# Patient Record
Sex: FEMALE | Race: WHITE | NOT HISPANIC OR LATINO | URBAN - METROPOLITAN AREA
[De-identification: names, ages, dates, MRNs, and addresses within clinical notes are randomized per-mention and may not be internally consistent; named-entity substitution may affect disease eponyms.]

---

## 2023-06-21 ENCOUNTER — MEDICAL CORRESPONDENCE (OUTPATIENT)
Dept: HEALTH INFORMATION MANAGEMENT | Facility: CLINIC | Age: 33
End: 2023-06-21
Payer: OTHER GOVERNMENT

## 2023-06-22 ENCOUNTER — TRANSCRIBE ORDERS (OUTPATIENT)
Dept: OTHER | Age: 33
End: 2023-06-22

## 2023-06-22 DIAGNOSIS — E03.8 SUBCLINICAL HYPOTHYROIDISM: Primary | ICD-10-CM

## 2024-05-02 NOTE — CONFIDENTIAL NOTE
RECORDS RECEIVED FROM: internal/ce    DATE RECEIVED: 5/8/24    NOTES (FOR ALL VISITS) STATUS DETAILS   OFFICE NOTES from referring provider ce Dr. Veronica Reeder @ [facility]    MEDICATION LIST     IMAGING      XR (Chest) ce [facility]- 3.13.24    LABS     DIABETES: HBGA1C, CREATININE, FASTING LIPIDS, MICROALBUMIN URINE, POTASSIUM, TSH, T4    THYROID: TSH, T4, CBC, THYRODLONULIN, TOTAL T3, FREE T4, CALCITONIN, CEA ce Bmp- 3.12.24  TSH- 2.27.24  T4- 2.27.24  CMP- 8/17/23  Lipid- 2.21.23

## 2024-05-08 ENCOUNTER — VIRTUAL VISIT (OUTPATIENT)
Dept: ENDOCRINOLOGY | Facility: CLINIC | Age: 34
End: 2024-05-08
Payer: OTHER GOVERNMENT

## 2024-05-08 ENCOUNTER — LAB (OUTPATIENT)
Dept: LAB | Facility: CLINIC | Age: 34
End: 2024-05-08
Payer: OTHER GOVERNMENT

## 2024-05-08 ENCOUNTER — PRE VISIT (OUTPATIENT)
Dept: ENDOCRINOLOGY | Facility: CLINIC | Age: 34
End: 2024-05-08

## 2024-05-08 DIAGNOSIS — E03.8 SUBCLINICAL HYPOTHYROIDISM: ICD-10-CM

## 2024-05-08 DIAGNOSIS — E03.9 HYPOTHYROIDISM, UNSPECIFIED TYPE: Primary | ICD-10-CM

## 2024-05-08 DIAGNOSIS — E03.9 HYPOTHYROIDISM, UNSPECIFIED TYPE: ICD-10-CM

## 2024-05-08 LAB
T4 FREE SERPL-MCNC: 0.85 NG/DL (ref 0.9–1.7)
TSH SERPL DL<=0.005 MIU/L-ACNC: 4.5 UIU/ML (ref 0.3–4.2)

## 2024-05-08 PROCEDURE — 99204 OFFICE O/P NEW MOD 45 MIN: CPT | Mod: 95 | Performed by: INTERNAL MEDICINE

## 2024-05-08 PROCEDURE — 84439 ASSAY OF FREE THYROXINE: CPT

## 2024-05-08 PROCEDURE — 86376 MICROSOMAL ANTIBODY EACH: CPT

## 2024-05-08 PROCEDURE — 36415 COLL VENOUS BLD VENIPUNCTURE: CPT

## 2024-05-08 PROCEDURE — 84443 ASSAY THYROID STIM HORMONE: CPT

## 2024-05-08 RX ORDER — LEVOTHYROXINE SODIUM 50 MCG
50 TABLET ORAL DAILY
Qty: 90 TABLET | Refills: 0 | Status: SHIPPED | OUTPATIENT
Start: 2024-05-08 | End: 2024-08-12

## 2024-05-08 NOTE — PATIENT INSTRUCTIONS
Patient instructions  The TSH is high and free T4 is low and this result is consistent with overt hypothyroidism.  This means your thyroid gland is not making enough thyroid hormone. This hormone is vital to body growth and metabolism. If you don't make enough, many body processes slow down. This can cause symptoms throughout the body. Hypothyroidism can range from mild to severe. There are a number of causes of hypothyroidism. A common cause is Hashimoto's disease. This disease causes the body's own immune system to attack the thyroid gland.  The thyroid peroxidase antibody is still pending and will reach out to you once we have results.      I recommend treatment of this condition with replacement of the thyroid hormone in the form of synthetic thyroid hormone-Synthroid.  I understand that previously you do not tolerate levothyroxine therefore brand form which is called Synthroid is sent to your pharmacy at 50 mcg daily.  Take your medication as prescribed.     Synthroid should be taken on empty stomach and wait at least 30 minutes before eating. Don't take supplements or multivitamins containing iron and calcium within 4 hours of taking levothyroxine tablet.     Treatment of hypothyroidism and normalization of thyroid hormones is recommended prior to pregnancy.  Please check a follow-up thyroid blood work in 6 weeks.  In the future when you are actively trying to get pregnant or get pregnant please let us know right away so that thyroid medication can be adjusted.      Check the following labs.   Orders Placed This Encounter   Procedures    TSH with free T4 reflex    Thyroid peroxidase antibody      Lab scheduling to schedule at any [place] lab locations: [phone] select option 1.

## 2024-05-08 NOTE — LETTER
5/8/2024       RE: Fely Burnham  [address]  [place] [address]     Dear Colleague,    Thank you for referring your patient, Fely Burnham, to the [facility] ENDOCRINOLOGY [facility] [place] at [facility]. Please see a copy of my visit note below.    Endocrinology Clinic Visit    Chief Complaint: Thyroid     Information obtained from:Patient      Assessment/Treatment Plan:      Hypothyroidism unspecified    TSH over the last 1 year has been ranging between 5.6-13.3 with normal free T4 levels confirming subclinical hypothyroidism.  Patient was initiated on levothyroxine 50 mcg daily however did not tolerate the medication due to palpitations.  Stopped medication in November 2023 and most recent TSH was 5.68 in February 2024.  Does not endorse significant hypothyroid symptoms.  Symptoms which led to thyroid blood work assessment well palpitation and anxiety.    Checked thyroid labs today and she has overt hypothyroidism with free T4 of 0.85 with TSH of 4.5.    Plan-start Synthroid 50 mcg daily [patient did not tolerate levothyroxine previously].  Check follow-up thyroid blood work in 6 weeks. Levothyroxine should be taken on empty stomach and wait at least 30 minutes before eating. Don't take supplements or multivitamins containing iron and calcium within 4 hours of taking levothyroxine tablet.     Implication of untreated hypothyroidism on pregnancy addressed.  Not actively trying to get pregnant at this time.    Patient instructions  The TSH is high and free T4 is low and this result is consistent with overt hypothyroidism.  This means your thyroid gland is not making enough thyroid hormone. This hormone is vital to body growth and metabolism. If you don't make enough, many body processes slow down. This can cause symptoms throughout the body. Hypothyroidism can range from mild to severe. There are a number of causes of hypothyroidism. A common  cause is Hashimoto's disease. This disease causes the body's own immune system to attack the thyroid gland.  The thyroid peroxidase antibody is still pending and will reach out to you once we have results.      I recommend treatment of this condition with replacement of the thyroid hormone in the form of synthetic thyroid hormone-Synthroid.  I understand that previously you do not tolerate levothyroxine therefore brand form which is called Synthroid is sent to your pharmacy at 50 mcg daily.  Take your medication as prescribed. Levothyroxine should be taken on empty stomach and wait at least 30 minutes before eating. Don't take supplements or multivitamins containing iron and calcium within 4 hours of taking levothyroxine tablet.     Treatment of hypothyroidism and normalization of thyroid hormones is recommended prior to pregnancy.  Please check a follow-up thyroid blood work in 6 weeks.  In the future when you are actively trying to get pregnant or get pregnant please let us know right away so that thyroid medication can be adjusted.    Orders Placed This Encounter   Procedures    TSH with free T4 reflex    Thyroid peroxidase antibody    TSH with free T4 reflex      Henrry Mas MD  Staff Endocrinologist    Division of Endocrinology and Diabetes      Subjective:         HPI: Fely Burnham is a 34 year old female with history of hypothyroidism who [facility]    Had palpitation and anxiety in January 2023 which led to thyroid blood work assessment and at that time TSH was elevated above 13 with normal free T4.  Later on was initiated on levothyroxine 50 mcg daily however did not tolerate the medication due to palpitations.  Has stopped levothyroxine replacement therapy and have not taken it since 11//2023.  Reports occasional palpitation however better than when she has been taking levothyroxine tablet.  Review of system negative for  cardiac, GI, urinary symptoms.  Menses regular.  Not currently pregnant or actively trying.  She has 1 child and they are planning in the future for a second.  Family history of thyroid disease.     Latest Ref Rng 5/8/2024  3:16 PM   ENDO THYROID LABS-[facility]     TSH 0.30 - 4.20 uIU/mL 4.50 (H)    FREE T4 0.90 - 1.70 ng/dL 0.85 (L)       No Known Allergies    No current outpatient medications on file.       Review of Systems     11 point review system (Constitutional, HENT, Eyes, Respiratory, Cardiovascular, Gastrointestinal, Genitourinary, Musculoskeletal,Neurological, Psychiatric/Behavioural, Endocrine) is negative or is as per HPI above    Past Medical History:   Diagnosis Date    Thyroid disease Jan 23    Subclinical       Past Surgical History:   Procedure Laterality Date    COLONOSCOPY  10/19    COSMETIC SURGERY  10/19 + jan 22    Breast implant then breast explant+ lift and smallest implant put in       Family History   Problem Relation Age of Onset    Anxiety Disorder Father     Osteoporosis Father     Other Cancer Maternal Grandmother         Nonhodgkin lymphoma    Thyroid Disease Paternal Grandmother         Hypo    Thyroid Disease Paternal Half-Sister         Actual sister not half - hypo thyroid       Social History     Socioeconomic History    Marital status:      Spouse name: None    Number of children: None    Years of education: None    Highest education level: None   Tobacco Use    Smoking status: Never     Passive exposure: Never    Smokeless tobacco: Never   Vaping Use    Vaping status: Never Used   Substance and Sexual Activity    Alcohol use: Yes     Comment: Few glasses of wine on the weekends or out to dinner    Drug use: Never    Sexual activity: Yes     Partners: Male     Birth control/protection: Pull-out method     Social Determinants of Health      Received [facility]    Social Connections   Interpersonal Safety: Not At Risk (3/12/2024)     Received from [facility] IP Custom IPV     Do you feel UNSAFE in any of your personal relationships with your family members or any other acquaintances?: No       Objective:   Appears well.    In House Labs:           This note has been dictated using voice recognition software.  As a result, there may be errors in the documentation that have gone undetected.  Please consider this when interpreting information in this documentation.      Video-Visit Details    Type of service:  Video Visit  Joined the call at 5/8/2024, 9:20:00 am.  Left the call at 5/8/2024, 9:35:27 am.  You were on the call for 15 minutes 27 seconds .  Distant Location (provider location):  Off-site.   Platform used for Video Visit: [employer]  40 minutes spent by me on the date of the encounter doing chart review on care everywhere, history, documentation and further activities per the note.

## 2024-05-08 NOTE — PROGRESS NOTES
Endocrinology Clinic Visit    Chief Complaint: Thyroid     Information obtained from:Patient      Assessment/Treatment Plan:      Hypothyroidism unspecified    TSH over the last 1 year has been ranging between 5.6-13.3 with normal free T4 levels confirming subclinical hypothyroidism.  Patient was initiated on levothyroxine 50 mcg daily however did not tolerate the medication due to palpitations.  Stopped medication in November 2023 and most recent TSH was 5.68 in February 2024.  Does not endorse significant hypothyroid symptoms.  Symptoms which led to thyroid blood work assessment well palpitation and anxiety.    Checked thyroid labs today and she has overt hypothyroidism with free T4 of 0.85 with TSH of 4.5.    Plan-start Synthroid 50 mcg daily [patient did not tolerate levothyroxine previously].  Check follow-up thyroid blood work in 6 weeks. Levothyroxine should be taken on empty stomach and wait at least 30 minutes before eating. Don't take supplements or multivitamins containing iron and calcium within 4 hours of taking levothyroxine tablet.     Implication of untreated hypothyroidism on pregnancy addressed.  Not actively trying to get pregnant at this time.    Patient instructions  The TSH is high and free T4 is low and this result is consistent with overt hypothyroidism.  This means your thyroid gland is not making enough thyroid hormone. This hormone is vital to body growth and metabolism. If you don't make enough, many body processes slow down. This can cause symptoms throughout the body. Hypothyroidism can range from mild to severe. There are a number of causes of hypothyroidism. A common cause is Hashimoto's disease. This disease causes the body's own immune system to attack the thyroid gland.  The thyroid peroxidase antibody is still pending and will reach out to you once we have results.      I recommend treatment of this condition with replacement of the thyroid hormone in the form of synthetic thyroid  hormone-Synthroid.  I understand that previously you do not tolerate levothyroxine therefore brand form which is called Synthroid is sent to your pharmacy at 50 mcg daily.  Take your medication as prescribed. Levothyroxine should be taken on empty stomach and wait at least 30 minutes before eating. Don't take supplements or multivitamins containing iron and calcium within 4 hours of taking levothyroxine tablet.     Treatment of hypothyroidism and normalization of thyroid hormones is recommended prior to pregnancy.  Please check a follow-up thyroid blood work in 6 weeks.  In the future when you are actively trying to get pregnant or get pregnant please let us know right away so that thyroid medication can be adjusted.    Orders Placed This Encounter   Procedures    TSH with free T4 reflex    Thyroid peroxidase antibody    TSH with free T4 reflex      Henrry Mas MD  Staff Endocrinologist    Division of Endocrinology and Diabetes      Subjective:         HPI: Fely Burnham is a 34 year old female with history of hypothyroidism who [facility].    Had palpitation and anxiety in January 2023 which led to thyroid blood work assessment and at that time TSH was elevated above 13 with normal free T4.  Later on was initiated on levothyroxine 50 mcg daily however did not tolerate the medication due to palpitations.  Has stopped levothyroxine replacement therapy and have not taken it since 11//2023.  Reports occasional palpitation however better than when she has been taking levothyroxine tablet.  Review of system negative for cardiac, GI, urinary symptoms.  Menses regular.  Not currently pregnant or actively trying.  She has 1 child and they are planning in the future for a second.  Family history of thyroid disease.     Latest Ref Rng 5/8/2024  3:16 PM   ENDO THYROID LABS-[facility]     TSH 0.30 - 4.20 uIU/mL 4.50 (H)    FREE T4 0.90 - 1.70 ng/dL 0.85  (L)       No Known Allergies    No current outpatient medications on file.       Review of Systems     11 point review system (Constitutional, HENT, Eyes, Respiratory, Cardiovascular, Gastrointestinal, Genitourinary, Musculoskeletal,Neurological, Psychiatric/Behavioural, Endocrine) is negative or is as per HPI above    Past Medical History:   Diagnosis Date    Thyroid disease Jan 23    Subclinical       Past Surgical History:   Procedure Laterality Date    COLONOSCOPY  10/19    COSMETIC SURGERY  10/19 + jan 22    Breast implant then breast explant+ lift and smallest implant put in       Family History   Problem Relation Age of Onset    Anxiety Disorder Father     Osteoporosis Father     Other Cancer Maternal Grandmother         Nonhodgkin lymphoma    Thyroid Disease Paternal Grandmother         Hypo    Thyroid Disease Paternal Half-Sister         Actual sister not half - hypo thyroid       Social History     Socioeconomic History    Marital status:      Spouse name: None    Number of children: None    Years of education: None    Highest education level: None   Tobacco Use    Smoking status: Never     Passive exposure: Never    Smokeless tobacco: Never   Vaping Use    Vaping status: Never Used   Substance and Sexual Activity    Alcohol use: Yes     Comment: Few glasses of wine on the weekends or out to dinner    Drug use: Never    Sexual activity: Yes     Partners: Male     Birth control/protection: Pull-out method     Social Determinants of Health      Received from [facility]    Social Connections   Interpersonal Safety: Not At Risk (3/12/2024)    Received from [facility]     IP Custom IPV     Do you feel UNSAFE in any of your personal relationships with your family members or any other acquaintances?: No       Objective:   Appears well.    In House Labs:           This note has been dictated using voice recognition software.  As a  result, there may be errors in the documentation that have gone undetected.  Please consider this when interpreting information in this documentation.      Video-Visit Details    Type of service:  Video Visit  Joined the call at 5/8/2024, 9:20:00 am.  Left the call at 5/8/2024, 9:35:27 am.  You were on the call for 15 minutes 27 seconds .  Distant Location (provider location):  Off-site.   Platform used for Video Visit: [employer]  40 minutes spent by me on the date of the encounter doing chart review on care everywhere, history, documentation and further activities per the note.

## 2024-05-09 ENCOUNTER — TELEPHONE (OUTPATIENT)
Dept: ENDOCRINOLOGY | Facility: CLINIC | Age: 34
End: 2024-05-09
Payer: OTHER GOVERNMENT

## 2024-05-09 LAB — THYROPEROXIDASE AB SERPL-ACNC: 712 IU/ML

## 2024-05-09 NOTE — RESULT ENCOUNTER NOTE
No my chart access.   Team, please call and inform the following.   The TSH is high and free T4 is low and this result is consistent with overt hypothyroidism.  You have hypothyroidism. This means your thyroid gland is not making enough thyroid hormone. This hormone is vital to body growth and metabolism. If you don't make enough, many body processes slow down. This can cause symptoms throughout the body. Hypothyroidism can range from mild to severe. There are a number of causes of hypothyroidism. A common cause is Hashimoto's disease. This disease causes the body's own immune system to attack the thyroid gland.  The thyroid peroxidase antibody is still pending and will reach out to you once we have results.      I recommend treatment of this condition with replacement of the thyroid hormone in the form of synthetic thyroid hormone-Synthroid.  I understand that previously you do not tolerate levothyroxine therefore brand form which is called Synthroid is sent to your pharmacy at 50 mcg daily.  Take your medication as prescribed And Levothyroxine should be taken on empty stomach and wait at least 30 minutes before eating. Don't take supplements or multivitamins containing iron and calcium within 4 hours of taking levothyroxine tablet.     Treatment of hypothyroidism and normalization of thyroid hormones is recommended prior to pregnancy.  Please check a follow-up thyroid blood work in 6 weeks.  In the future when you are actively trying to get pregnant or get pregnant please let us know right away so that thyroid medication can be adjusted.    Please let us know if any questions.    Henrry Mas MD

## 2024-05-09 NOTE — TELEPHONE ENCOUNTER
Spoke w/ Pt. Confirms understanding of review and recommendation from Dr Mas.   No questions at this time.   Cynthia Bazzi, RN on 5/9/2024 at 7:32 AM            Message  Received: Yesterday  Henrry Mas MD   Med Specialties Endo Triage-  No my chart access.  Team, please call and inform the following.  The TSH is high and free T4 is low and this result is consistent with overt hypothyroidism.  You have hypothyroidism. This means your thyroid gland is not making enough thyroid hormone. This hormone is vital to body growth and metabolism. If you don't make enough, many body processes slow down. This can cause symptoms throughout the body. Hypothyroidism can range from mild to severe. There are a number of causes of hypothyroidism. A common cause is Hashimoto's disease. This disease causes the body's own immune system to attack the thyroid gland.  The thyroid peroxidase antibody is still pending and will reach out to you once we have results.      I recommend treatment of this condition with replacement of the thyroid hormone in the form of synthetic thyroid hormone-Synthroid.  I understand that previously you do not tolerate levothyroxine therefore brand form which is called Synthroid is sent to your pharmacy at 50 mcg daily.  Take your medication as prescribed And Levothyroxine should be taken on empty stomach and wait at least 30 minutes before eating. Don't take supplements or multivitamins containing iron and calcium within 4 hours of taking levothyroxine tablet.    Treatment of hypothyroidism and normalization of thyroid hormones is recommended prior to pregnancy.  Please check a follow-up thyroid blood work in 6 weeks.  In the future when you are actively trying to get pregnant or get pregnant please let us know right away so that thyroid medication can be adjusted.    Please let us know if any questions.    Henrry Mas MD

## 2024-05-10 NOTE — RESULT ENCOUNTER NOTE
No my chart access.  Team, please send a letter (additional information on lab results included]    Fely Burnham   The TSH is high and free T4 is low and this result is consistent with overt hypothyroidism.  You have hypothyroidism. This means your thyroid gland is not making enough thyroid hormone. This hormone is vital to body growth and metabolism. If you don't make enough, many body processes slow down. This can cause symptoms throughout the body. Hypothyroidism can range from mild to severe.     There are a number of causes of hypothyroidism. A common cause is Hashimoto's disease. This disease causes the body's own immune system to attack the thyroid gland.  The thyroid peroxidase antibody is positive in your case which confirms a diagnosis of hypothyroidism secondary to Hashimoto thyroiditis.    I recommend treatment of this condition with replacement of the thyroid hormone in the form of synthetic thyroid hormone-Synthroid.  I understand that previously you do not tolerate levothyroxine therefore brand form which is called Synthroid is sent to your pharmacy at 50 mcg daily.      Take your medication as prescribed And Levothyroxine should be taken on empty stomach and wait at least 30 minutes before eating. Don't take supplements or multivitamins containing iron and calcium within 4 hours of taking levothyroxine tablet.    Treatment of hypothyroidism and normalization of thyroid hormones is recommended prior to pregnancy.  Please check a follow-up thyroid blood work in 6 weeks.  In the future when you are actively trying to get pregnant or get pregnant please let us know right away so that thyroid medication can be adjusted.    Please let us know if any questions.    Henrry Mas MD

## 2024-05-13 ENCOUNTER — TELEPHONE (OUTPATIENT)
Dept: ENDOCRINOLOGY | Facility: CLINIC | Age: 34
End: 2024-05-13
Payer: OTHER GOVERNMENT

## 2024-05-13 NOTE — TELEPHONE ENCOUNTER
Lab review and recommendation mailed to pt via "[employer].   Cynthia Bazzi RN on 5/13/2024 at 9:02 AM          Message  Received: 3 days ago  Henrry Mas MD  [facility] Endo Triage-  No my chart access.  Team, please send a letter (additional information on lab results included]    Fely BLAZE Monae Burnham  The TSH is high and free T4 is low and this result is consistent with overt hypothyroidism.  You have hypothyroidism. This means your thyroid gland is not making enough thyroid hormone. This hormone is vital to body growth and metabolism. If you don't make enough, many body processes slow down. This can cause symptoms throughout the body. Hypothyroidism can range from mild to severe.    There are a number of causes of hypothyroidism. A common cause is Hashimoto's disease. This disease causes the body's own immune system to attack the thyroid gland.  The thyroid peroxidase antibody is positive in your case which confirms a diagnosis of hypothyroidism secondary to Hashimoto thyroiditis.     I recommend treatment of this condition with replacement of the thyroid hormone in the form of synthetic thyroid hormone-Synthroid.  I understand that previously you do not tolerate levothyroxine therefore brand form which is called Synthroid is sent to your pharmacy at 50 mcg daily.      Take your medication as prescribed And Levothyroxine should be taken on empty stomach and wait at least 30 minutes before eating. Don't take supplements or multivitamins containing iron and calcium within 4 hours of taking levothyroxine tablet.     Treatment of hypothyroidism and normalization of thyroid hormones is recommended prior to pregnancy.  Please check a follow-up thyroid blood work in 6 weeks.  In the future when you are actively trying to get pregnant or get pregnant please let us know right away so that thyroid medication can be adjusted.     Please let us know if any questions.     Henrry Mas MD

## 2024-05-21 ENCOUNTER — MYC MEDICAL ADVICE (OUTPATIENT)
Dept: ENDOCRINOLOGY | Facility: CLINIC | Age: 34
End: 2024-05-21
Payer: OTHER GOVERNMENT

## 2024-06-01 ENCOUNTER — MYC MEDICAL ADVICE (OUTPATIENT)
Dept: ENDOCRINOLOGY | Facility: CLINIC | Age: 34
End: 2024-06-01
Payer: OTHER GOVERNMENT

## 2024-06-01 DIAGNOSIS — Z3A.01 LESS THAN 8 WEEKS GESTATION OF PREGNANCY: ICD-10-CM

## 2024-06-01 DIAGNOSIS — E06.3 HYPOTHYROIDISM DUE TO HASHIMOTO'S THYROIDITIS: Primary | ICD-10-CM

## 2024-06-06 NOTE — TELEPHONE ENCOUNTER
Patient was seen for a new consultation on 5/8/24.   TSH over the last 1 year has been ranging between 5.6-13.3 with normal free T4 levels confirming subclinical hypothyroidism.  Patient was initiated on levothyroxine 50 mcg daily however did not tolerate the medication due to palpitations.  Stopped medication in November 2023 and when she came to establish care was not on anti-thyroid medication.     At the time of visit on 5/8/24 - overt hypothyroidism with free T4 of 0.85 with TSH of 4.5.  Started on Levothyroxine 50 mcg daily on 5/8/24.   Pt reported on 6/2/24 started medication about a week and half ago due to symptoms of palpitations.   Reported on 6/2/ 24 that pregnant currently and conception date May 13 per report.   Advised to continue the medication at a full dose.   Weight based levothyroxine 99 mcg daily.   [place] and [place] lab on Monday Gladis 10, 2024.   Increase dose of levothyroxine after Gladis 10 lab.   Adjust levothyroxine based on lab results.   Henrry Mas MD

## 2024-06-10 ENCOUNTER — LAB (OUTPATIENT)
Dept: LAB | Facility: CLINIC | Age: 34
End: 2024-06-10
Payer: OTHER GOVERNMENT

## 2024-06-10 DIAGNOSIS — Z3A.01 LESS THAN 8 WEEKS GESTATION OF PREGNANCY: ICD-10-CM

## 2024-06-10 DIAGNOSIS — E06.3 HYPOTHYROIDISM DUE TO HASHIMOTO'S THYROIDITIS: ICD-10-CM

## 2024-06-10 DIAGNOSIS — E03.9 HYPOTHYROIDISM, UNSPECIFIED TYPE: ICD-10-CM

## 2024-06-10 LAB
T4 FREE SERPL-MCNC: 1.15 NG/DL (ref 0.9–1.7)
T4 SERPL-MCNC: 5.9 UG/DL (ref 4.5–11.7)
TSH SERPL DL<=0.005 MIU/L-ACNC: 2.82 UIU/ML (ref 0.3–4.2)

## 2024-06-10 PROCEDURE — 84436 ASSAY OF TOTAL THYROXINE: CPT

## 2024-06-10 PROCEDURE — 84443 ASSAY THYROID STIM HORMONE: CPT

## 2024-06-10 PROCEDURE — 36415 COLL VENOUS BLD VENIPUNCTURE: CPT

## 2024-06-10 PROCEDURE — 84439 ASSAY OF FREE THYROXINE: CPT

## 2024-06-10 NOTE — RESULT ENCOUNTER NOTE
"Hello -    I received your message \" I just want to note that I didnt start the synthroid may 22. I have bloodwork tomorrow so we will see where it is but I just wanted you to know that in case it hasnt leveled off yet that is probably why. I was out of town from may 10 - 21 so didnt start taking it until the 22nd of may so been on it about 3 weeks\"    Based on this information and below -  -TSH (thyroid stimulating hormone) level is normal.   ADVISE: continuing same replacement dose and rechecking this in 6 weeks.  Levothyroxine should be taken on empty stomach and wait at least 30 minutes before eating. Don't take supplements or multivitamins containing iron and calcium within 4 hours of taking levothyroxine tablet.       Please let us know if you have any questions or concerns.     Regards,  Henrry Mas MD"

## 2024-07-18 ENCOUNTER — MYC MEDICAL ADVICE (OUTPATIENT)
Dept: ENDOCRINOLOGY | Facility: CLINIC | Age: 34
End: 2024-07-18

## 2024-07-18 ENCOUNTER — LAB (OUTPATIENT)
Dept: LAB | Facility: CLINIC | Age: 34
End: 2024-07-18
Payer: OTHER GOVERNMENT

## 2024-07-18 DIAGNOSIS — E06.3 HYPOTHYROIDISM DUE TO HASHIMOTO'S THYROIDITIS: ICD-10-CM

## 2024-07-18 LAB — TSH SERPL DL<=0.005 MIU/L-ACNC: 0.84 UIU/ML (ref 0.3–4.2)

## 2024-07-18 PROCEDURE — 36415 COLL VENOUS BLD VENIPUNCTURE: CPT

## 2024-07-18 PROCEDURE — 84443 ASSAY THYROID STIM HORMONE: CPT

## 2024-07-28 ENCOUNTER — HEALTH MAINTENANCE LETTER (OUTPATIENT)
Age: 34
End: 2024-07-28

## 2024-08-12 ENCOUNTER — MYC REFILL (OUTPATIENT)
Dept: ENDOCRINOLOGY | Facility: CLINIC | Age: 34
End: 2024-08-12
Payer: OTHER GOVERNMENT

## 2024-08-12 DIAGNOSIS — E03.9 HYPOTHYROIDISM, UNSPECIFIED TYPE: ICD-10-CM

## 2024-08-13 RX ORDER — LEVOTHYROXINE SODIUM 50 MCG
50 TABLET ORAL DAILY
Qty: 90 TABLET | Refills: 4 | Status: SHIPPED | OUTPATIENT
Start: 2024-08-13 | End: 2024-09-16 | Stop reason: DRUGHIGH

## 2024-08-13 NOTE — TELEPHONE ENCOUNTER
SYNTHROID 50 MCG tablet   Last Written Prescription Date:  5/8/2024  Last Fill Quantity: 90,   # refills: 0  Last Office Visit : 5/8/2024  Future Office visit:  5/7/2025  Routing SYNTHROID 50 MCG tablet refill request to provider for review/approval because: continue on this dose?  - plan last visit 5/8/2024: Synthroid 50 mcg daily [patient did not tolerate levothyroxine previously]. Check follow-up thyroid blood work in 6 weeks

## 2024-09-13 ENCOUNTER — MYC MEDICAL ADVICE (OUTPATIENT)
Dept: ENDOCRINOLOGY | Facility: CLINIC | Age: 34
End: 2024-09-13

## 2024-09-13 ENCOUNTER — LAB (OUTPATIENT)
Dept: LAB | Facility: CLINIC | Age: 34
End: 2024-09-13
Payer: OTHER GOVERNMENT

## 2024-09-13 DIAGNOSIS — E06.3 HYPOTHYROIDISM DUE TO HASHIMOTO'S THYROIDITIS: ICD-10-CM

## 2024-09-13 DIAGNOSIS — E06.3 HYPOTHYROIDISM DUE TO HASHIMOTO'S THYROIDITIS: Primary | ICD-10-CM

## 2024-09-13 LAB
T4 FREE SERPL-MCNC: 0.85 NG/DL (ref 0.9–1.7)
TSH SERPL DL<=0.005 MIU/L-ACNC: 5.91 UIU/ML (ref 0.3–4.2)

## 2024-09-13 PROCEDURE — 36415 COLL VENOUS BLD VENIPUNCTURE: CPT

## 2024-09-13 PROCEDURE — 84443 ASSAY THYROID STIM HORMONE: CPT

## 2024-09-13 PROCEDURE — 84439 ASSAY OF FREE THYROXINE: CPT

## 2024-09-16 RX ORDER — LEVOTHYROXINE SODIUM 88 MCG
88 TABLET ORAL DAILY
Qty: 90 TABLET | Refills: 0 | Status: SHIPPED | OUTPATIENT
Start: 2024-09-16

## 2024-10-10 ENCOUNTER — LAB (OUTPATIENT)
Dept: LAB | Facility: CLINIC | Age: 34
End: 2024-10-10
Payer: OTHER GOVERNMENT

## 2024-10-10 DIAGNOSIS — E06.3 HYPOTHYROIDISM DUE TO HASHIMOTO'S THYROIDITIS: ICD-10-CM

## 2024-10-10 LAB — TSH SERPL DL<=0.005 MIU/L-ACNC: 2.7 UIU/ML (ref 0.3–4.2)

## 2024-10-10 PROCEDURE — 36415 COLL VENOUS BLD VENIPUNCTURE: CPT

## 2024-10-10 PROCEDURE — 84443 ASSAY THYROID STIM HORMONE: CPT

## 2024-11-25 ENCOUNTER — LAB (OUTPATIENT)
Dept: LAB | Facility: CLINIC | Age: 34
End: 2024-11-25
Payer: OTHER GOVERNMENT

## 2024-11-25 DIAGNOSIS — E06.3 HYPOTHYROIDISM DUE TO HASHIMOTO'S THYROIDITIS: ICD-10-CM

## 2024-11-25 LAB — TSH SERPL DL<=0.005 MIU/L-ACNC: 1.62 UIU/ML (ref 0.3–4.2)

## 2024-11-25 PROCEDURE — 36415 COLL VENOUS BLD VENIPUNCTURE: CPT

## 2024-11-25 PROCEDURE — 84443 ASSAY THYROID STIM HORMONE: CPT

## 2024-11-27 NOTE — RESULT ENCOUNTER NOTE
Hello -    Here are my comments about your recent results:  -TSH (thyroid stimulating hormone) level is normal which indicates appropriate thyroid replacement dosing.  ADVISE: continuing same replacement dose.     Please let us know if you have any questions or concerns.     Regards,  Henrry Mas MD